# Patient Record
Sex: MALE | Race: WHITE | NOT HISPANIC OR LATINO | Employment: FULL TIME | URBAN - METROPOLITAN AREA
[De-identification: names, ages, dates, MRNs, and addresses within clinical notes are randomized per-mention and may not be internally consistent; named-entity substitution may affect disease eponyms.]

---

## 2024-07-24 ENCOUNTER — APPOINTMENT (OUTPATIENT)
Dept: GENERAL RADIOLOGY | Facility: HOSPITAL | Age: 54
End: 2024-07-24
Payer: OTHER MISCELLANEOUS

## 2024-07-24 ENCOUNTER — HOSPITAL ENCOUNTER (EMERGENCY)
Facility: HOSPITAL | Age: 54
Discharge: HOME | End: 2024-07-24
Attending: EMERGENCY MEDICINE
Payer: OTHER MISCELLANEOUS

## 2024-07-24 VITALS
SYSTOLIC BLOOD PRESSURE: 144 MMHG | DIASTOLIC BLOOD PRESSURE: 95 MMHG | RESPIRATION RATE: 16 BRPM | TEMPERATURE: 97.2 F | OXYGEN SATURATION: 97 % | HEART RATE: 97 BPM

## 2024-07-24 DIAGNOSIS — S70.01XA CONTUSION OF RIGHT HIP, INITIAL ENCOUNTER: Primary | ICD-10-CM

## 2024-07-24 PROCEDURE — 99283 EMERGENCY DEPT VISIT LOW MDM: CPT

## 2024-07-24 PROCEDURE — 96372 THER/PROPH/DIAG INJ SC/IM: CPT

## 2024-07-24 PROCEDURE — 25010000002 HYDROMORPHONE 1 MG/ML SOLUTION: Performed by: EMERGENCY MEDICINE

## 2024-07-24 PROCEDURE — 73502 X-RAY EXAM HIP UNI 2-3 VIEWS: CPT

## 2024-07-24 RX ORDER — OXYCODONE HYDROCHLORIDE AND ACETAMINOPHEN 5; 325 MG/1; MG/1
1 TABLET ORAL EVERY 6 HOURS PRN
Qty: 12 TABLET | Refills: 0 | Status: SHIPPED | OUTPATIENT
Start: 2024-07-24

## 2024-07-24 RX ORDER — OXYCODONE HYDROCHLORIDE AND ACETAMINOPHEN 5; 325 MG/1; MG/1
1 TABLET ORAL ONCE
Status: COMPLETED | OUTPATIENT
Start: 2024-07-24 | End: 2024-07-24

## 2024-07-24 RX ADMIN — OXYCODONE AND ACETAMINOPHEN 1 TABLET: 5; 325 TABLET ORAL at 18:17

## 2024-07-24 RX ADMIN — HYDROMORPHONE HYDROCHLORIDE 1 MG: 1 INJECTION, SOLUTION INTRAMUSCULAR; INTRAVENOUS; SUBCUTANEOUS at 16:28

## 2024-07-24 NOTE — ED NOTES
Pt to ER via PV from home for L hip pain. Pt said a propane tank hit his L hip and it is now painful

## 2024-07-24 NOTE — ED PROVIDER NOTES
MD ATTESTATION NOTE    SHARED VISIT: This visit was performed by BOTH a physician and an APC. The substantive portion of the medical decision making was performed by this attesting physician who made or approved the management plan and takes responsibility for patient management. All studies in the APC note (if performed) were independently interpreted by me.     The INGRID and I have discussed this patient's history, physical exam, and treatment plan.  I have reviewed the documentation and affirm the documentation and agree with the treatment and plan.  The attached note describes my personal findings.      Independent Historians: Patient    A complete HPI/ROS/PMH/PSH/SH/FH are unobtainable due to: None    Chronic or social conditions impacting patient care (social determinants of health): None    Alfonso Mota is a 54 y.o. male who presents to the ED c/o acute left hip and side pain after being struck by a large propane tank that he was loading at work.  This occurred 2 days ago and pain has gradually gotten worse.  Patient with the worst pain when he is weightbearing and ambulating.  Patient took an NSAID without relief.  Patient not on any anticoagulation.  Patient denies any abdominal pain or spine pain.  Patient denies any distal numbness or tingling.          On exam:  GENERAL: In pain obese male lying prone on the ED stretcher with left leg dangling off of bed, alert, cooperative and conversant  SKIN: Warm, dry  HENT: Normocephalic, atraumatic  RESPIRATORY: Relaxed breathing  MUSCULOSKELETAL: no distal left lower extremity deformity   NEURO: alert, moves all extremities, follows commands                                                     Radiology  XR Hip With or Without Pelvis 2 - 3 View Left    Result Date: 7/24/2024  LEFT HIP, 2 VIEWS WITH SINGLE VIEW PELVIS  HISTORY: Left hip injury and pain  COMPARISON: None available  FINDINGS: No evidence for acute fracture or dislocation. There is some mild  degenerative changes of both hips.      No acute finding    This report was finalized on 7/24/2024 5:10 PM by Dr. Kostas Magdaleno M.D on Workstation: [id number]       Medical Decision Making:  ED Course as of 07/24/24 2300   Wed Jul 24, 2024   1719 My independent interpretation of the left hip x-rays is no acute fracture of the hip or pelvis. [KA]   1823 I reassessed the patient, counseled him on his imaging results.  He is feeling much improved.  Will prescribe a short course of pain medication, counseled on home care and indications for return.  Also recommended follow-up with orthopedics. [KA]      ED Course User Index  [KA] Taylor Kulkarni PA-C       MDM: Patient with a significant contusion to his left hip.  Other possibilities include fracture or dislocation or even pelvic fracture.  Plan for x-ray to further evaluate.    Procedures:  Procedures        PPE: I followed hospital protocols for proper PPE based on patient presentation including use of N95 mask for suspected infectious respiratory conditions.  Proper hand hygiene was performed both before and after the patient encounter.          Diagnosis  Final diagnoses:   Contusion of right hip, initial encounter       Note Disclaimer: At [facility], we believe that sharing information builds trust and better relationships. You are receiving this note because you recently visited [facility]. It is possible you will see health information before a provider has talked with you about it. This kind of information can be easy to misunderstand. To help you fully understand what it means for your health, we urge you to discuss this note with your provider.       Suha Cartagena MD  07/25/24 8528

## 2024-07-24 NOTE — Clinical Note
[facility] EMERGENCY DEPARTMENT  [address] [facility] [address]  Phone: [phone]    Alfonso Mota was seen and treated in our emergency department on 7/24/2024.  He may return to work on 07/29/2024.         Thank you for choosing [facility].    Suha Cartagena MD

## 2024-07-24 NOTE — ED PROVIDER NOTES
EMERGENCY DEPARTMENT ENCOUNTER  Room Number:  37/37  PCP: Sb Ferro MD  Independent Historians: Patient      HPI:  Chief Complaint: had concerns including Hip Injury.       A complete HPI/ROS/PMH/PSH/SH/FH are unobtainable due to: None    Chronic or social conditions impacting patient care (Social Determinants of Health): None      Context: Alfonso Mota is a 54 y.o. male who presents to the ED c/o acute left hip pain.  He states that he was loading a very large propane tank. It was attached to a hoist and the propane shifted inside the tank causing the tank to strike him in the left hip area.  This occurred 2 days ago.  He has had gradually increasing pain since, today severe, required a walker to ambulate due to increased pain with weightbearing.  He tried taking some leftover Indocin without much improvement.  No other complaints at this time.  He is not anticoagulated.      Review of prior external notes (non-ED) -and- Review of prior external test results outside of this encounter:  Office visit with family medicine on 4/5/2024 for back pain and blood in urine.  Urinalysis showed trace blood        PAST MEDICAL HISTORY  Active Ambulatory Problems     Diagnosis Date Noted    No Active Ambulatory Problems     Resolved Ambulatory Problems     Diagnosis Date Noted    No Resolved Ambulatory Problems     No Additional Past Medical History         PAST SURGICAL HISTORY  History reviewed. No pertinent surgical history.      FAMILY HISTORY  History reviewed. No pertinent family history.      SOCIAL HISTORY  Social History     Socioeconomic History    Marital status:    Tobacco Use    Smoking status: Unknown   Substance and Sexual Activity    Alcohol use: Defer    Drug use: Defer    Sexual activity: Defer         ALLERGIES  Patient has no known allergies.      REVIEW OF SYSTEMS  Review of Systems  Included in HPI  All systems reviewed and negative except for those discussed in HPI.      PHYSICAL EXAM    I  have reviewed the triage vital signs and nursing notes.    ED Triage Vitals   Temp Heart Rate Resp BP SpO2   07/24/24 1528 07/24/24 1528 07/24/24 1528 07/24/24 1605 07/24/24 1528   97.2 °F (36.2 °C) 100 17 (!) 143/103 98 %      Temp src Heart Rate Source Patient Position BP Location FiO2 (%)   -- -- -- -- --              Physical Exam  GENERAL: alert, no acute distress  SKIN: Warm, dry  HENT: Normocephalic, atraumatic  EYES: no scleral icterus  CV: regular rhythm, regular rate  RESPIRATORY: normal effort, lungs clear  ABDOMEN: nondistended soft nontender  MUSCULOSKELETAL: no deformity.  On inspection of the left hip there is no edema ecchymosis or deformity.  Range of motion performed, does increase pain.  The area is diffusely tender to palpation.  Distal neurovascular exam intact.  NEURO: alert, moves all extremities, follows commands            LAB RESULTS  No results found for this or any previous visit (from the past 24 hour(s)).      RADIOLOGY  XR Hip With or Without Pelvis 2 - 3 View Left    Result Date: 7/24/2024  LEFT HIP, 2 VIEWS WITH SINGLE VIEW PELVIS  HISTORY: Left hip injury and pain  COMPARISON: None available  FINDINGS: No evidence for acute fracture or dislocation. There is some mild degenerative changes of both hips.      No acute finding    This report was finalized on 7/24/2024 5:10 PM by Dr. Kostsa Magdaleno M.D on Workstation: [id number]         MEDICATIONS GIVEN IN ER  Medications   HYDROmorphone (DILAUDID) injection 1 mg (1 mg Intramuscular Given 7/24/24 1628)   oxyCODONE-acetaminophen (PERCOCET) 5-325 MG per tablet 1 tablet (1 tablet Oral Given 7/24/24 1817)         ORDERS PLACED DURING THIS VISIT:  Orders Placed This Encounter   Procedures    XR Hip With or Without Pelvis 2 - 3 View Left         OUTPATIENT MEDICATION MANAGEMENT:  No current Epic-ordered facility-administered medications on file.     Current Outpatient Medications Ordered in Epic   Medication Sig Dispense Refill     oxyCODONE-acetaminophen (PERCOCET) 5-325 MG per tablet Take 1 tablet by mouth Every 6 (Six) Hours As Needed for Severe Pain. 12 tablet 0         PROCEDURES  Procedures            PROGRESS, DATA ANALYSIS, CONSULTS, AND MEDICAL DECISION MAKING  All labs have been independently interpreted by me.  All radiology studies have been reviewed by me. All EKG's have been independently viewed and interpreted by me.  Discussion below represents my analysis of pertinent findings related to patient's condition, differential diagnosis, treatment plan and final disposition.    DIFFERENTIAL    Differential diagnosis includes but is not limited to soft tissue hematoma, hip fracture, pelvis fracture, sciatica, radiculopathy    Clinical Scores:                  ED Course as of 07/24/24 1824 Wed Jul 24, 2024 1719 My independent interpretation of the left hip x-rays is no acute fracture of the hip or pelvis. [KA]   1823 I reassessed the patient, counseled him on his imaging results.  He is feeling much improved.  Will prescribe a short course of pain medication, counseled on home care and indications for return.  Also recommended follow-up with orthopedics. [KA]      ED Course User Index  [KA] Taylor Kulkanri PA-C             AS OF 18:24 EDT VITALS:    BP - 144/95  HR - 97  TEMP - 97.2 °F (36.2 °C)  O2 SATS - 97%    COMPLEXITY OF CARE  Admission was considered but after careful review of the patient's presentation, physical examination, diagnostic results, and response to treatment the patient may be safely discharged with outpatient follow-up.      DIAGNOSIS  Final diagnoses:   Contusion of right hip, initial encounter         DISPOSITION  ED Disposition       ED Disposition   Discharge    Condition   Good    Comment   --                  FOLLOW UP  Sb Ferro MD  [address] [facility] [address] [phone]          Christo Mjeia MD  [address] [facility] [address]  [facility] [id number] [phone]    In 3  days          Prescribed Medications     Medication List        New Prescriptions      oxyCODONE-acetaminophen 5-325 MG per tablet  Commonly known as: PERCOCET  Take 1 tablet by mouth Every 6 (Six) Hours As Needed for Severe Pain.               Where to Get Your Medications        These medications were sent to [facility] - [place], [place] - [address] - [phone]  - [phone] FX  [address], [facility] [address]      Phone: [phone]   oxyCODONE-acetaminophen 5-325 MG per tablet                   Please note that portions of this document were completed with a voice recognition program.    Note Disclaimer: At [facility], we believe that sharing information builds trust and better relationships. You are receiving this note because you recently visited [facility]. It is possible you will see health information before a provider has talked with you about it. This kind of information can be easy to misunderstand. To help you fully understand what it means for your health, we urge you to discuss this note with your provider.         Taylor Kulkarni PA-C  07/24/24 1825